# Patient Record
Sex: FEMALE
[De-identification: names, ages, dates, MRNs, and addresses within clinical notes are randomized per-mention and may not be internally consistent; named-entity substitution may affect disease eponyms.]

---
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
## 2020-12-19 ENCOUNTER — RX ONLY (RX ONLY)
Age: 20
End: 2020-12-19

## 2020-12-19 ENCOUNTER — ECZEMA (OUTPATIENT)
Dept: URBAN - METROPOLITAN AREA CLINIC 34 | Facility: CLINIC | Age: 20
Setting detail: DERMATOLOGY
End: 2020-12-19

## 2020-12-19 DIAGNOSIS — B07.8 OTHER VIRAL WARTS: ICD-10-CM

## 2020-12-19 PROCEDURE — 99213 OFFICE O/P EST LOW 20 MIN: CPT

## 2020-12-19 RX ORDER — HYDROCORTISONE 25 MG/G
1 APPLICATION OINTMENT TOPICAL BID
Qty: 28.35 | Refills: 1
Start: 2020-12-19

## 2021-03-27 ENCOUNTER — RX ONLY (RX ONLY)
Age: 21
End: 2021-03-27

## 2021-03-27 ENCOUNTER — ECZEMA (OUTPATIENT)
Dept: URBAN - METROPOLITAN AREA CLINIC 34 | Facility: CLINIC | Age: 21
Setting detail: DERMATOLOGY
End: 2021-03-27

## 2021-03-27 DIAGNOSIS — D18.01 HEMANGIOMA OF SKIN AND SUBCUTANEOUS TISSUE: ICD-10-CM

## 2021-03-27 DIAGNOSIS — L73.8 OTHER SPECIFIED FOLLICULAR DISORDERS: ICD-10-CM

## 2021-03-27 DIAGNOSIS — L56.8 OTHER SPECIFIED ACUTE SKIN CHANGES DUE TO ULTRAVIOLET RADIATION: ICD-10-CM

## 2021-03-27 PROCEDURE — 99214 OFFICE O/P EST MOD 30 MIN: CPT

## 2022-01-31 ENCOUNTER — APPOINTMENT (RX ONLY)
Dept: URBAN - METROPOLITAN AREA CLINIC 12 | Facility: CLINIC | Age: 22
Setting detail: DERMATOLOGY
End: 2022-01-31

## 2022-01-31 DIAGNOSIS — Z41.1 ENCOUNTER FOR COSMETIC SURGERY: ICD-10-CM

## 2022-01-31 PROCEDURE — ? PATIENT SPECIFIC COUNSELING

## 2022-01-31 PROCEDURE — ? CONSULTATION - RHINOPLASTY

## 2022-01-31 ASSESSMENT — LOCATION DETAILED DESCRIPTION DERM: LOCATION DETAILED: NASAL SUPRATIP

## 2022-01-31 ASSESSMENT — LOCATION SIMPLE DESCRIPTION DERM: LOCATION SIMPLE: NOSE

## 2022-01-31 ASSESSMENT — LOCATION ZONE DERM: LOCATION ZONE: NOSE

## 2022-01-31 NOTE — PROCEDURE: PATIENT SPECIFIC COUNSELING
Detail Level: Zone
Other (Free Text): Patient presents today with dissatisfaction of nasal hump and looking to have rhinoplasty. \\n\\nDrPhuc Levi evaluated the patient. Dr. Levi agrees that patient is a good candidate for rhinoplasty.  \\n\\nThe following were noted:\\nDMN not active in the nose. \\nRight Inferior turbinate is enlarged. \\nAnterior Bump on septum right and left.\\n\\nQuote was provided by Ct. Patient would like to discuss further with father before making a decision.

## 2022-01-31 NOTE — PROCEDURE: CONSULTATION - RHINOPLASTY
Consultation Charge $ (Use Numbers Only, No Text Please.): 125
Detail Level: Detailed
Send Procedure Quote As Charge: No